# Patient Record
Sex: FEMALE | Race: WHITE
[De-identification: names, ages, dates, MRNs, and addresses within clinical notes are randomized per-mention and may not be internally consistent; named-entity substitution may affect disease eponyms.]

---

## 2017-12-06 ENCOUNTER — HOSPITAL ENCOUNTER (EMERGENCY)
Dept: HOSPITAL 74 - JER | Age: 57
LOS: 1 days | Discharge: HOME | End: 2017-12-07
Payer: COMMERCIAL

## 2017-12-06 DIAGNOSIS — R41.82: ICD-10-CM

## 2017-12-06 DIAGNOSIS — F10.120: Primary | ICD-10-CM

## 2017-12-06 NOTE — PDOC
History of Present Illness





- General


History Source: Patient, Family (daughter )


Exam Limitations: No Limitations





- History of Present Illness


Initial Comments: 





12/06/17 21:11


The patient is a 57 year old female with no significant past medical history  

who presents to the ED s/p alcohol intoxication since earlier today. Patient  

states she did not eat anything since Friday  and consumed large amounts of 

vodka earlier today. Daughter states the patient was not acting at her mental 

baseline and was . Patient states she is drinking because she feels depressed. 


Denies fever or chills. Denies loss of consciousness. Denies any other 

symptoms. 





Social hx: The patient is a social drinker. 














<Lacy Porter - Last Filed: 12/06/17 21:11>





<Gemini Saavedra - Last Filed: 12/06/17 21:46>





- General


Stated Complaint: Alcohol intoxication





Past History





<Lacy Porter - Last Filed: 12/06/17 21:11>





- Past Medical History


Anemia: No


Asthma: No


Cancer: No


Cardiac Disorders: No


CVA: No


COPD: No


CHF: No


Dementia: No


Diabetes: No


GI Disorders: No


 Disorders: No


HTN: Yes


Hypercholesterolemia: Yes


Liver Disease: No


Seizures: No


Thyroid Disease: No





- Surgical History


Abdominal Surgery: No


Appendectomy: No


Cardiac Surgery: No


Cholecystectomy: No


Lung Surgery: No


Neurologic Surgery: No


Orthopedic Surgery: No





- Suicide/Smoking/Psychosocial Hx


Smoking History: Current every day smoker ("hookah")


Hx Alcohol Use: Yes


Drug/Substance Use Hx: Yes


Substance Use Type: Alcohol


Hx Substance Use Treatment: No





<Gemini Saavedra - Last Filed: 12/06/17 21:46>





- Past Medical History


Allergies/Adverse Reactions: 


 Allergies











Allergy/AdvReac Type Severity Reaction Status Date / Time


 


No Allergy Information Allergy   Verified 07/04/16 11:01





Available     











Home Medications: 


Ambulatory Orders





Thiamine HCl [Vitamin B1 -] 100 mg PO DAILY  tablet 07/08/16 











**Review of Systems





- Review of Systems


Able to Perform ROS?: Yes


Comments:: 





12/06/17 21:11





GENERAL/CONSTITUTIONAL: + alcohol intoxication. No fever or chills. No weakness.


HEAD, EYES, EARS, NOSE AND THROAT: No change in vision. No ear pain or 

discharge. No sore throat.


CARDIOVASCULAR: No chest pain or shortness of breath.


RESPIRATORY: No cough, wheezing, or hemoptysis.


GASTROINTESTINAL: No nausea, vomiting, diarrhea or constipation.


GENITOURINARY: No dysuria, frequency, or change in urination.


MUSCULOSKELETAL: No joint or muscle swelling or pain. No neck or back pain.


SKIN: No rash


NEUROLOGIC: + altered mental status. No headache, vertigo, loss of consciousness

, or change in strength.


ENDOCRINE: No increased thirst. No abnormal weight change.


HEMATOLOGIC/LYMPHATIC: No anemia, easy bleeding, or history of blood clots.


ALLERGIC/IMMUNOLOGIC: No hives or skin allergy.





All Other Systems: Reviewed and Negative





<Lacy Porter - Last Filed: 12/06/17 21:11>





*Physical Exam





- Physical Exam


Comments: 





12/06/17 21:11





GENERAL: Awake, alert, in no acute distress


HEAD: No signs of trauma


EYES: + eyes injected. PERRLA, EOMI, conjunctiva clear


ENT: Auricles normal inspection, hearing grossly normal, nares patent, 

oropharynx clear without exudates. Moist mucosa


NECK: Normal ROM, supple, no lymphadenopathy, JVD, or masses


LUNGS: Breath sounds equal, clear to auscultation bilaterally.  No wheezes, and 

no crackles


HEART: Regular rate and rhythm, normal S1 and S2, no murmurs, rubs or gallops


ABDOMEN: Soft, nontender, normoactive bowel sounds.  No guarding, no rebound.  

No masses


EXTREMITIES: Normal range of motion, no edema.  No clubbing or cyanosis. No 

cords, erythema, or tenderness


NEUROLOGICAL: + slurred speech, A&O x 3, moving all extremities. 


SKIN: Warm, Dry, normal turgor, no rashes or lesions noted.





<Lacy Porter - Last Filed: 12/06/17 21:11>





Medical Decision Making





- Medical Decision Making





12/06/17 20:58


57-year-old female here today brought with her daughter for concerns for 

altered mental status per the daughter presumes the patient had been drinking 

alcohol however the patient has been denying it denies any recent trauma falls 

or other injuries. States the patient does not drink daily because she was 

acting so altered they brought her here for evaluation


On examination patient is awake somewhat irritable lungs are clear heart is 

regular without murmurs rubs or gallops abdomen is soft and nontender 

extremities are warm well atraumatic. Neurologically she is alert and oriented 

moving all extremities .





Differential includes alcohol intoxication other drug use the plan will check 

labs EtOH level and tox screen and reassess her sobriety








12/06/17 21:42


Patient admits to drinking vodka today is here with her daughter. Discussion 

with the daughter has no concerns for other ingestions. States patient does get 

like this periodically. No history of prior suicide attempts did have an 

argument with another sibling which prompted her to drink today daughter states 

she is comfortable bringing her home and would like to decline lab work and 

take the patient home patient iis requesting to go with family. 





<Gemini Saavedra - Last Filed: 12/06/17 21:46>





*DC/Admit/Observation/Transfer





- Attestations


Scribe Attestion: 





12/06/17 21:11





Documentation prepared by Lacy Porter, acting as medical scribe for Gemini Saavedra MD





<Lacy Porter - Last Filed: 12/06/17 21:11>





- Discharge Dispostion


Admit: No





<Gemini Saavedra - Last Filed: 12/06/17 21:46>


Diagnosis at time of Disposition: 


 Alcohol intoxication








- Discharge Dispostion


Disposition: HOME


Condition at time of disposition: Good





- Patient Instructions


Printed Discharge Instructions:  Alcohol and Stress: There are Safer Ways to 

[place]


Additional Instructions: 


avoid from heavy alcohol intake. return for any concerns.

## 2021-02-11 ENCOUNTER — HOSPITAL ENCOUNTER (EMERGENCY)
Dept: HOSPITAL 74 - JVIRT | Age: 61
Discharge: HOME | End: 2021-02-11
Payer: COMMERCIAL

## 2021-02-11 DIAGNOSIS — J02.9: Primary | ICD-10-CM

## 2021-02-11 DIAGNOSIS — R51.9: ICD-10-CM

## 2021-02-11 DIAGNOSIS — R50.9: ICD-10-CM
